# Patient Record
Sex: MALE | Race: BLACK OR AFRICAN AMERICAN | Employment: FULL TIME | URBAN - METROPOLITAN AREA
[De-identification: names, ages, dates, MRNs, and addresses within clinical notes are randomized per-mention and may not be internally consistent; named-entity substitution may affect disease eponyms.]

---

## 2021-05-24 ENCOUNTER — HOSPITAL ENCOUNTER (EMERGENCY)
Age: 41
Discharge: HOME | End: 2021-05-24
Attending: EMERGENCY MEDICINE
Payer: COMMERCIAL

## 2021-05-24 ENCOUNTER — APPOINTMENT (OUTPATIENT)
Dept: CT IMAGING | Age: 41
End: 2021-05-24
Attending: EMERGENCY MEDICINE
Payer: COMMERCIAL

## 2021-05-24 VITALS
RESPIRATION RATE: 16 BRPM | TEMPERATURE: 98 F | OXYGEN SATURATION: 99 % | SYSTOLIC BLOOD PRESSURE: 122 MMHG | BODY MASS INDEX: 23.49 KG/M2 | HEIGHT: 68 IN | WEIGHT: 155 LBS | DIASTOLIC BLOOD PRESSURE: 54 MMHG | HEART RATE: 61 BPM

## 2021-05-24 DIAGNOSIS — N20.0 KIDNEY STONE: Primary | ICD-10-CM

## 2021-05-24 DIAGNOSIS — N28.9 RENAL INSUFFICIENCY: ICD-10-CM

## 2021-05-24 PROCEDURE — 96375 TX/PRO/DX INJ NEW DRUG ADDON: CPT

## 2021-05-24 PROCEDURE — 83690 ASSAY OF LIPASE: CPT | Performed by: EMERGENCY MEDICINE

## 2021-05-24 PROCEDURE — 96361 HYDRATE IV INFUSION ADD-ON: CPT

## 2021-05-24 PROCEDURE — 87086 URINE CULTURE/COLONY COUNT: CPT | Performed by: EMERGENCY MEDICINE

## 2021-05-24 PROCEDURE — 80053 COMPREHEN METABOLIC PANEL: CPT | Performed by: EMERGENCY MEDICINE

## 2021-05-24 PROCEDURE — 99284 EMERGENCY DEPT VISIT MOD MDM: CPT

## 2021-05-24 PROCEDURE — 74176 CT ABD & PELVIS W/O CONTRAST: CPT | Performed by: EMERGENCY MEDICINE

## 2021-05-24 PROCEDURE — 85025 COMPLETE CBC W/AUTO DIFF WBC: CPT | Performed by: EMERGENCY MEDICINE

## 2021-05-24 PROCEDURE — 96374 THER/PROPH/DIAG INJ IV PUSH: CPT

## 2021-05-24 PROCEDURE — 81001 URINALYSIS AUTO W/SCOPE: CPT | Performed by: EMERGENCY MEDICINE

## 2021-05-24 RX ORDER — HYDROCODONE BITARTRATE AND ACETAMINOPHEN 5; 325 MG/1; MG/1
1-2 TABLET ORAL EVERY 6 HOURS PRN
Qty: 10 TABLET | Refills: 0 | Status: ON HOLD | OUTPATIENT
Start: 2021-05-24 | End: 2021-05-27

## 2021-05-24 RX ORDER — KETOROLAC TROMETHAMINE 30 MG/ML
30 INJECTION, SOLUTION INTRAMUSCULAR; INTRAVENOUS ONCE
Status: COMPLETED | OUTPATIENT
Start: 2021-05-24 | End: 2021-05-24

## 2021-05-24 RX ORDER — SODIUM CHLORIDE 9 MG/ML
INJECTION, SOLUTION INTRAVENOUS CONTINUOUS
Status: DISCONTINUED | OUTPATIENT
Start: 2021-05-24 | End: 2021-05-24

## 2021-05-24 RX ORDER — TAMSULOSIN HYDROCHLORIDE 0.4 MG/1
0.4 CAPSULE ORAL DAILY
Qty: 7 CAPSULE | Refills: 0 | Status: SHIPPED | OUTPATIENT
Start: 2021-05-24 | End: 2021-05-31

## 2021-05-24 RX ORDER — ONDANSETRON 4 MG/1
4 TABLET, ORALLY DISINTEGRATING ORAL EVERY 4 HOURS PRN
Qty: 10 TABLET | Refills: 0 | Status: SHIPPED | OUTPATIENT
Start: 2021-05-24 | End: 2021-05-31

## 2021-05-24 RX ORDER — ONDANSETRON 2 MG/ML
4 INJECTION INTRAMUSCULAR; INTRAVENOUS ONCE
Status: COMPLETED | OUTPATIENT
Start: 2021-05-24 | End: 2021-05-24

## 2021-05-24 RX ORDER — PROPRANOLOL HYDROCHLORIDE 60 MG/1
TABLET ORAL
COMMUNITY
Start: 2021-05-21

## 2021-05-24 NOTE — ED PROVIDER NOTES
Patient Seen in: [facility] Emergency Department In [place]      History   Patient presents with:  Abdomen/Flank Pain    Stated Complaint: right flank pain, decreased appetite, emesis since yesterday     HPI/Subjective:   HPI    Patient 72-year-old male w and rhythm, no murmurs. BACK: Mild right CVA tenderness, no midline bony tenderness. ABDOMEN: + Bowel sounds, soft, mild tenderness right flank, nondistended. No rebound, no guarding, no hepatosplenomegaly.   EXTREMITIES: Full range of motion, no tendern diffuse right-sided perinephric fat stranding. 2. There is a 2 mm nonobstructing stone within a right midpole calyx. MDM      Patient with IV fluids. Patient given Toradol for pain. Patient given Zofran. Patient felt improved.   Patient decli

## 2021-05-24 NOTE — ED INITIAL ASSESSMENT (HPI)
Right flank pain since yesterday. Radiates to abd/groin. No fever. Denies urinary symptoms.  +nausea

## 2021-05-26 ENCOUNTER — HOSPITAL ENCOUNTER (OUTPATIENT)
Facility: HOSPITAL | Age: 41
Setting detail: OBSERVATION
Discharge: HOME | End: 2021-05-27
Attending: EMERGENCY MEDICINE | Admitting: HOSPITALIST
Payer: COMMERCIAL

## 2021-05-26 DIAGNOSIS — N20.0 KIDNEY STONE: Primary | ICD-10-CM

## 2021-05-26 PROCEDURE — 99220 INITIAL OBSERVATION CARE,LEVL III: CPT | Performed by: HOSPITALIST

## 2021-05-26 RX ORDER — ONDANSETRON 2 MG/ML
4 INJECTION INTRAMUSCULAR; INTRAVENOUS EVERY 6 HOURS PRN
Status: DISCONTINUED | OUTPATIENT
Start: 2021-05-26 | End: 2021-05-27

## 2021-05-26 RX ORDER — HYDROMORPHONE HYDROCHLORIDE 1 MG/ML
0.5 INJECTION, SOLUTION INTRAMUSCULAR; INTRAVENOUS; SUBCUTANEOUS
Status: DISCONTINUED | OUTPATIENT
Start: 2021-05-26 | End: 2021-05-28

## 2021-05-26 RX ORDER — ONDANSETRON 2 MG/ML
4 INJECTION INTRAMUSCULAR; INTRAVENOUS EVERY 4 HOURS PRN
Status: DISCONTINUED | OUTPATIENT
Start: 2021-05-26 | End: 2021-05-26

## 2021-05-26 RX ORDER — HYDROMORPHONE HYDROCHLORIDE 1 MG/ML
0.5 INJECTION, SOLUTION INTRAMUSCULAR; INTRAVENOUS; SUBCUTANEOUS ONCE
Status: DISCONTINUED | OUTPATIENT
Start: 2021-05-26 | End: 2021-05-26

## 2021-05-26 RX ORDER — SODIUM CHLORIDE 9 MG/ML
INJECTION, SOLUTION INTRAVENOUS CONTINUOUS
Status: DISCONTINUED | OUTPATIENT
Start: 2021-05-26 | End: 2021-05-28

## 2021-05-26 RX ORDER — HYDROMORPHONE HYDROCHLORIDE 1 MG/ML
0.5 INJECTION, SOLUTION INTRAMUSCULAR; INTRAVENOUS; SUBCUTANEOUS EVERY 30 MIN PRN
Status: DISCONTINUED | OUTPATIENT
Start: 2021-05-26 | End: 2021-05-26 | Stop reason: HOSPADM

## 2021-05-26 RX ORDER — TAMSULOSIN HYDROCHLORIDE 0.4 MG/1
0.4 CAPSULE ORAL DAILY
Status: DISCONTINUED | OUTPATIENT
Start: 2021-05-27 | End: 2021-05-28

## 2021-05-26 RX ORDER — ONDANSETRON 2 MG/ML
4 INJECTION INTRAMUSCULAR; INTRAVENOUS EVERY 6 HOURS PRN
Status: DISCONTINUED | OUTPATIENT
Start: 2021-05-26 | End: 2021-05-28

## 2021-05-26 RX ORDER — KETOROLAC TROMETHAMINE 15 MG/ML
15 INJECTION, SOLUTION INTRAMUSCULAR; INTRAVENOUS ONCE
Status: COMPLETED | OUTPATIENT
Start: 2021-05-26 | End: 2021-05-26

## 2021-05-26 RX ORDER — PROCHLORPERAZINE EDISYLATE 5 MG/ML
5 INJECTION INTRAMUSCULAR; INTRAVENOUS EVERY 8 HOURS PRN
Status: DISCONTINUED | OUTPATIENT
Start: 2021-05-26 | End: 2021-05-28

## 2021-05-26 RX ORDER — SODIUM CHLORIDE 9 MG/ML
INJECTION, SOLUTION INTRAVENOUS CONTINUOUS
Status: ACTIVE | OUTPATIENT
Start: 2021-05-26 | End: 2021-05-27

## 2021-05-26 RX ORDER — SODIUM CHLORIDE 9 MG/ML
INJECTION, SOLUTION INTRAVENOUS CONTINUOUS
Status: DISCONTINUED | OUTPATIENT
Start: 2021-05-27 | End: 2021-05-28

## 2021-05-26 RX ORDER — KETOROLAC TROMETHAMINE 30 MG/ML
30 INJECTION, SOLUTION INTRAMUSCULAR; INTRAVENOUS EVERY 6 HOURS PRN
Status: DISCONTINUED | OUTPATIENT
Start: 2021-05-26 | End: 2021-05-28

## 2021-05-26 NOTE — ED INITIAL ASSESSMENT (HPI)
Seen here Monday for r flank pain- dx with kidney stone-- taking pain meds at home but pain returned

## 2021-05-26 NOTE — ED PROVIDER NOTES
Patient Seen in: THE [facility] Emergency Department In [place]      History   Patient presents with:  Abdomen/Flank Pain    Stated Complaint: flank pain, kidney stone    HPI/Subjective:   HPI    55-year-old -American male presents emergency room today to auscultation bilaterally. Heart is regular rate and rhythm without murmur gallop or rub    Abdomen is soft nondistended nontender to deep palpation there is no rebound or guarding noted no hepatosplenomegaly is noted. No masses are noted.   No hernias his kidney stone.   Admission disposition: 5/26/2021  8:06 PM                                  Disposition and Plan     Clinical Impression:  Kidney stone  (primary encounter diagnosis)     Disposition:  Admit  5/26/2021  8:06 pm    Follow-up:  No follow-up

## 2021-05-27 ENCOUNTER — APPOINTMENT (OUTPATIENT)
Dept: GENERAL RADIOLOGY | Facility: HOSPITAL | Age: 41
End: 2021-05-27
Attending: INTERNAL MEDICINE
Payer: COMMERCIAL

## 2021-05-27 ENCOUNTER — ANESTHESIA (OUTPATIENT)
Dept: SURGERY | Facility: HOSPITAL | Age: 41
End: 2021-05-27
Payer: COMMERCIAL

## 2021-05-27 ENCOUNTER — ANESTHESIA EVENT (OUTPATIENT)
Dept: SURGERY | Facility: HOSPITAL | Age: 41
End: 2021-05-27
Payer: COMMERCIAL

## 2021-05-27 VITALS
BODY MASS INDEX: 24.25 KG/M2 | RESPIRATION RATE: 20 BRPM | TEMPERATURE: 98 F | HEART RATE: 68 BPM | WEIGHT: 160 LBS | SYSTOLIC BLOOD PRESSURE: 150 MMHG | DIASTOLIC BLOOD PRESSURE: 75 MMHG | HEIGHT: 68 IN | OXYGEN SATURATION: 96 %

## 2021-05-27 PROCEDURE — 0TC68ZZ EXTIRPATION OF MATTER FROM RIGHT URETER, VIA NATURAL OR ARTIFICIAL OPENING ENDOSCOPIC: ICD-10-PCS | Performed by: UROLOGY

## 2021-05-27 PROCEDURE — 0T768DZ DILATION OF RIGHT URETER WITH INTRALUMINAL DEVICE, VIA NATURAL OR ARTIFICIAL OPENING ENDOSCOPIC: ICD-10-PCS | Performed by: UROLOGY

## 2021-05-27 PROCEDURE — 99225 SUBSEQUENT OBSERVATION CARE: CPT | Performed by: INTERNAL MEDICINE

## 2021-05-27 DEVICE — URETERAL STENT
Type: IMPLANTABLE DEVICE | Site: URETER | Status: FUNCTIONAL
Brand: ASCERTA™

## 2021-05-27 RX ORDER — HYDROMORPHONE HYDROCHLORIDE 1 MG/ML
0.4 INJECTION, SOLUTION INTRAMUSCULAR; INTRAVENOUS; SUBCUTANEOUS EVERY 5 MIN PRN
Status: DISCONTINUED | OUTPATIENT
Start: 2021-05-27 | End: 2021-05-27 | Stop reason: HOSPADM

## 2021-05-27 RX ORDER — NALOXONE HYDROCHLORIDE 0.4 MG/ML
80 INJECTION, SOLUTION INTRAMUSCULAR; INTRAVENOUS; SUBCUTANEOUS AS NEEDED
Status: DISCONTINUED | OUTPATIENT
Start: 2021-05-27 | End: 2021-05-27 | Stop reason: HOSPADM

## 2021-05-27 RX ORDER — METOCLOPRAMIDE HYDROCHLORIDE 5 MG/ML
10 INJECTION INTRAMUSCULAR; INTRAVENOUS AS NEEDED
Status: DISCONTINUED | OUTPATIENT
Start: 2021-05-27 | End: 2021-05-27 | Stop reason: HOSPADM

## 2021-05-27 RX ORDER — DEXAMETHASONE SODIUM PHOSPHATE 4 MG/ML
VIAL (ML) INJECTION AS NEEDED
Status: DISCONTINUED | OUTPATIENT
Start: 2021-05-27 | End: 2021-05-27 | Stop reason: SURG

## 2021-05-27 RX ORDER — HYDROCODONE BITARTRATE AND ACETAMINOPHEN 5; 325 MG/1; MG/1
2 TABLET ORAL AS NEEDED
Status: DISCONTINUED | OUTPATIENT
Start: 2021-05-27 | End: 2021-05-27 | Stop reason: HOSPADM

## 2021-05-27 RX ORDER — ONDANSETRON 2 MG/ML
4 INJECTION INTRAMUSCULAR; INTRAVENOUS AS NEEDED
Status: DISCONTINUED | OUTPATIENT
Start: 2021-05-27 | End: 2021-05-27 | Stop reason: HOSPADM

## 2021-05-27 RX ORDER — SODIUM CHLORIDE, SODIUM LACTATE, POTASSIUM CHLORIDE, CALCIUM CHLORIDE 600; 310; 30; 20 MG/100ML; MG/100ML; MG/100ML; MG/100ML
INJECTION, SOLUTION INTRAVENOUS CONTINUOUS
Status: DISCONTINUED | OUTPATIENT
Start: 2021-05-27 | End: 2021-05-27 | Stop reason: HOSPADM

## 2021-05-27 RX ORDER — HYDROCODONE BITARTRATE AND ACETAMINOPHEN 5; 325 MG/1; MG/1
1 TABLET ORAL AS NEEDED
Status: DISCONTINUED | OUTPATIENT
Start: 2021-05-27 | End: 2021-05-27 | Stop reason: HOSPADM

## 2021-05-27 RX ORDER — ONDANSETRON 2 MG/ML
INJECTION INTRAMUSCULAR; INTRAVENOUS AS NEEDED
Status: DISCONTINUED | OUTPATIENT
Start: 2021-05-27 | End: 2021-05-27 | Stop reason: SURG

## 2021-05-27 RX ORDER — LIDOCAINE HYDROCHLORIDE 20 MG/ML
JELLY TOPICAL AS NEEDED
Status: DISCONTINUED | OUTPATIENT
Start: 2021-05-27 | End: 2021-05-27 | Stop reason: HOSPADM

## 2021-05-27 RX ORDER — CEFAZOLIN SODIUM/WATER 2 G/20 ML
2 SYRINGE (ML) INTRAVENOUS
Status: COMPLETED | OUTPATIENT
Start: 2021-05-27 | End: 2021-05-27

## 2021-05-27 RX ORDER — MIDAZOLAM HYDROCHLORIDE 1 MG/ML
INJECTION INTRAMUSCULAR; INTRAVENOUS AS NEEDED
Status: DISCONTINUED | OUTPATIENT
Start: 2021-05-27 | End: 2021-05-27 | Stop reason: SURG

## 2021-05-27 RX ORDER — LIDOCAINE HYDROCHLORIDE 10 MG/ML
INJECTION, SOLUTION EPIDURAL; INFILTRATION; INTRACAUDAL; PERINEURAL AS NEEDED
Status: DISCONTINUED | OUTPATIENT
Start: 2021-05-27 | End: 2021-05-27 | Stop reason: SURG

## 2021-05-27 RX ORDER — HYDROCODONE BITARTRATE AND ACETAMINOPHEN 5; 325 MG/1; MG/1
1-2 TABLET ORAL EVERY 6 HOURS PRN
Qty: 10 TABLET | Refills: 0 | Status: SHIPPED | OUTPATIENT
Start: 2021-05-27 | End: 2021-06-03

## 2021-05-27 RX ADMIN — ONDANSETRON 4 MG: 2 INJECTION INTRAMUSCULAR; INTRAVENOUS at 16:40:00

## 2021-05-27 RX ADMIN — LIDOCAINE HYDROCHLORIDE 50 MG: 10 INJECTION, SOLUTION EPIDURAL; INFILTRATION; INTRACAUDAL; PERINEURAL at 16:03:00

## 2021-05-27 RX ADMIN — MIDAZOLAM HYDROCHLORIDE 2 MG: 1 INJECTION INTRAMUSCULAR; INTRAVENOUS at 16:03:00

## 2021-05-27 RX ADMIN — DEXAMETHASONE SODIUM PHOSPHATE 4 MG: 4 MG/ML VIAL (ML) INJECTION at 16:17:00

## 2021-05-27 RX ADMIN — CEFAZOLIN SODIUM/WATER 2 G: 2 G/20 ML SYRINGE (ML) INTRAVENOUS at 16:04:00

## 2021-05-27 NOTE — PROGRESS NOTES
Pt returned from PACU in stable condition. Regular diet. Pt denies any pain. VS stable, afebrile. Pt reported he voided in PACU; pt will be due to void. Pt instructed to use call light when wanted to get out of bed. Wife at bedside. Plan of care discussed.

## 2021-05-27 NOTE — H&P
[facility]  History and Physical     Amado Wilson Patient Status:  Observation    1980 MRN [id number]   [facility] ([place], [place]) 3NW-A Attending Veronica Oreilly MD   Hosp Day # 0 PCP No primary care provider on file.      Chief Compla capsule (0.4 mg total) by mouth daily for 7 days. , Disp: 7 capsule, Rfl: 0        Review of Systems:   A comprehensive 14 point review of systems was completed. Pertinent positives and negatives noted in the HPI.     Physical Exam:    /74 (BP Locat CK in the last 168 hours. Inflammatory Markers  No results for input(s): CRP, COLEEN, LDH, DDIMER in the last 168 hours. Imaging: Imaging data reviewed in Epic. ASSESSMENT / PLAN:     1. Kidney stone  1. IVF  2. NPO  3. Urology on consult  4.  Pain

## 2021-05-27 NOTE — PROGRESS NOTES
NURSING ADMISSION NOTE      Patient admitted via Wheelchair  Oriented to room. Safety precautions initiated. Bed in low position. Call light in reach. Pt stable, resting in bed. MD hospitalist paged for orders.  Dr Karmen Garnica aware of patient admission

## 2021-05-27 NOTE — PROGRESS NOTES
[facility]  Progress Note     Maggie Joe Patient Status:  Observation    1980 MRN [id number]   [facility] 3NW-A Attending Otoniel Melendez,    Hosp Day # 0 PCP No primary care provider on file.      Chief Complaint in the last 168 hours. Cardiac  No results for input(s): TROP, PBNP in the last 168 hours. Creatinine Kinase  No results for input(s): CK in the last 168 hours.     Inflammatory Markers  No results for input(s): CRP, COLEEN, LDH, DDIMER in the last 168 h

## 2021-05-27 NOTE — ANESTHESIA PROCEDURE NOTES
Airway  Date/Time: 5/27/2021 4:01 PM  Urgency: elective    Airway not difficult    General Information and Staff    Patient location during procedure: OR  Anesthesiologist: Renee Burrell MD  Performed: anesthesiologist     Indications and Patient Conditio

## 2021-05-27 NOTE — ANESTHESIA PREPROCEDURE EVALUATION
PRE-OP EVALUATION    Patient Name: Ragini Perez    Admit Diagnosis: Kidney stone [N20.0]    Pre-op Diagnosis: Right ureteral calculus [N20.1]    CYSTOSCOPY, RIGHT RETROGRADE PYELOGRAM, RIGHT URETEROSCOPIC STONE EXTRACTION, POSSIBLE LASER LITHTRIPSY, R allergies. Anesthesia Evaluation    Patient summary reviewed.     Anesthetic Complications           GI/Hepatic/Renal  Comment: Kidney stone  MANAN                               Cardiovascular            MET: >4      (+) hypertension

## 2021-05-27 NOTE — ANESTHESIA POSTPROCEDURE EVALUATION
[address] Patient Status:  Observation   Age/Gender 36year old male MRN [id number]   [facility] SURGERY Attending Holly Mccabe, [facility] Day # 0 PCP No primary care provider on file.        Anesthesia Post-o

## 2021-05-27 NOTE — CONSULTS
[facility] Urology   Consultation Note    Robin Santiago Patient Status:  Observation    1980 MRN [id number]   [facility] 3NW-A Attending Ran Scott MD   Hosp Day # 0 PCP No primary care provider on file.      Reason review of systems completed.       Physical Exam:  BP (!) 112/91 (BP Location: Right arm)   Pulse 51   Temp 98 °F (36.7 °C) (Oral)   Resp 16   Ht 5' 8\" (1.727 m)   Wt 160 lb (72.6 kg)   SpO2 99%   BMI 24.33 kg/m²   GENERAL: The patient is resting in bed in CONCLUSION:     1. There is mild right-sided hydronephrosis and hydroureter secondary to a 4 mm obstructing stone within the mid right ureter.  There is mild diffuse right-sided perinephric fat stranding.    2. There is a 2 mm nonobstructing stone within and infection. Also, the scope and laser are capable of perforating the ureter or kidney which could result in a scar or stricture requiring future procedures. These considerations were thoroughly discussed with the patient.       Discussed treatment option 15.9 12.9* 12.1*   HCT 46.9 37.1* 35.2*   MCV 89.2 87.9 89.3   MCH 30.2 30.6 30.7   MCHC 33.9 34.8 34.4   RDW 12.4 12.0 11.9   NEPRELIM 16.86* 11.70* 8.30*   WBC 21.1* 15.8* 12.7*   .0 212.0 197.0       Recent Labs   Lab 05/24/21  1341 05/27/21  053

## 2021-05-27 NOTE — PLAN OF CARE
Pt alert and oriented x4. Pt denies need for pain medication. Pt sleepy, easy to arouse. NPO since midnight; pre op flowsheet completed with patient. Up ad martha. Pt denies Sob, chest pain, n/v. Pt denies difficulty voiding. IVF infusing per mar.  Wife at bed period  Description: INTERVENTIONS  - Monitor WBC  - Administer growth factors as ordered  - Implement neutropenic guidelines  5/27/2021 1025 by Tiffany Aguero RN  Outcome: Progressing  5/27/2021 1025 by Tiffany Aguero RN  Outcome: Progressing     Prob RN  Outcome: Progressing

## 2021-05-27 NOTE — PLAN OF CARE
A&Ox4. VSS. GI: Abdomen soft, nondistended. Passing gas. Denies nausea at this time. : Voids. Complaints of right sided flank pain. Ice packs offered. Pain controlled with PRN pain medications. Toradol and Dilaudid ordered. Up ad martha.   Diet: General FALL  Goal: Free from fall injury  Description: INTERVENTIONS:  - Assess pt frequently for physical needs  - Identify cognitive and physical deficits and behaviors that affect risk of falls.   - Esmond fall precautions as indicated by assessment.  - Educ

## 2021-05-27 NOTE — OPERATIVE REPORT
URETEROSCOPY OPERATIVE NOTE    PATIENT NAME: Rosalie Reinoso  YOB: 1980  DATE OF SERVICE: 5/27/2021    SURGEON:  You Gutierrez MD    ASSISTANT:  None    PREOPERATIVE DIAGNOSIS:  Right Mid ureteral calculus    POSTOPERATIVE DIAGNOSIS:  Same and prepped and draped in the normal sterile fashion using betadine. We then performed an operative time out to confirm the correct patient, procedure, and laterality. Everyone in the room was in agreement.      I began by inserting a 22F rigid cystoscope

## 2021-05-28 NOTE — PROGRESS NOTES
NURSING DISCHARGE NOTE    Dr Kylah Cabrera paged for pain medication prescription at discharge. Discharge instructions discussed with pt and his wife. All questions answered. Pt declining transport and discharged Home via Ambulatory.  Accompanied by Family me

## 2021-05-28 NOTE — PAYOR COMM NOTE
Discharge Notification    Patient Name: Jesse Maloney  Payor: Bryn [place]  Subscriber #: [id number]  Authorization Number: N/A  Admit Date/Time: 5/26/2021 5:55 PM  Discharge Date/Time: 5/27/2021 8:20 PM

## (undated) DEVICE — Device

## (undated) DEVICE — SYRINGE 20CC LL TIP

## (undated) DEVICE — ZIPWIRE GUIDEWIRE .035X150 STR

## (undated) DEVICE — CYSTO CDS-LF: Brand: MEDLINE INDUSTRIES, INC.

## (undated) DEVICE — 1.9FR NITINOL TIPLESS BSKT

## (undated) DEVICE — ADHESIVE MASTISOL 2/3CC VL

## (undated) DEVICE — SCD SLEEVE KNEE HI BLEND

## (undated) DEVICE — SOL  .9 3000ML

## (undated) DEVICE — STERILE SYNTHETIC NEOPRENE POWDER-FREE SURGICAL GLOVES WITH NITRILE COATING, SMOOTH FINISH, STRAIGHT FINGER: Brand: PROTEXIS

## (undated) DEVICE — TIGERTAIL 5F FLXTIP 70CM

## (undated) NOTE — LETTER
Terri Shah 89  [address] [facility] [address] [facility]  Authorization for Surgical Operation and Procedure     Date:___________                                                                                                         Time:__________ transfusion and/or blood products.   The following are some, but not all, of the potential risks that can occur: fever and allergic reactions, hemolytic reactions, transmission of diseases such as Hepatitis, AIDS and Cytomegalovirus (CMV) and fluid overload person authorized to consent on my behalf). The surgeon or my attending physician will determine when the applicable recovery period ends for purposes of reinstating the DNAR order.   10. Patients having a sterilization procedure: I understand that if the p patient asking for anesthesia services, I agree to:  a. Allow the anesthesiologist (anesthesia doctor) to give me medicine and do additional procedures as necessary.  Some examples are: Starting or using an “IV” to give me medicine, fluids or blood during m rhythms and nerve injury. 7. Regional Anesthesia (“spinal”, “epidural”, & “nerve blocks”): I understand that rare but potential complications include headache, bleeding, infection, seizure, irregular heart rhythms, and nerve injury.     I can change my mi

## (undated) NOTE — LETTER
Terri Shah 89  [address] [facility] [address] [facility]  Authorization for Surgical Operation and Procedure     Date:___________                                                                                                         Time:__________ as a result of receiving a blood transfusion and/or blood products.   The following are some, but not all, of the potential risks that can occur: fever and allergic reactions, hemolytic reactions, transmission of diseases such as Hepatitis, AIDS and Cytomeg explicitly stated by me (or a person authorized to consent on my behalf). The surgeon or my attending physician will determine when the applicable recovery period ends for purposes of reinstating the DNAR order.   10. Patients having a sterilization procedu [employer]    2. As the patient asking for anesthesia services, I agree to:  a. Allow the anesthesiologist (anesthesia doctor) to give me medicine and do additional procedures as necessary.  Some examples are: Starting or using an “IV” to give bleeding, seizure, irregular heart rhythms and nerve injury. 7. Regional Anesthesia (“spinal”, “epidural”, & “nerve blocks”):   I understand that rare but potential complications include headache, bleeding, infection, seizure, irregular heart rhythms, and

## (undated) NOTE — LETTER
Terri Shah 89  [address] [facility] [address] [facility]  Authorization for Surgical Operation and Procedure     Date:___________                                                                                                         Time:__________ transfusion and/or blood products.   The following are some, but not all, of the potential risks that can occur: fever and allergic reactions, hemolytic reactions, transmission of diseases such as Hepatitis, AIDS and Cytomegalovirus (CMV) and fluid overload person authorized to consent on my behalf). The surgeon or my attending physician will determine when the applicable recovery period ends for purposes of reinstating the DNAR order.   10. Patients having a sterilization procedure: I understand that if the p patient asking for anesthesia services, I agree to:  a. Allow the anesthesiologist (anesthesia doctor) to give me medicine and do additional procedures as necessary.  Some examples are: Starting or using an “IV” to give me medicine, fluids or blood during m rhythms and nerve injury. 7. Regional Anesthesia (“spinal”, “epidural”, & “nerve blocks”): I understand that rare but potential complications include headache, bleeding, infection, seizure, irregular heart rhythms, and nerve injury.     I can change my mi

## (undated) NOTE — LETTER
Terri Shah 89  [address] [facility] [address] [facility]  Authorization for Surgical Operation and Procedure     Date:___________                                                                                                         Time:__________ and/or blood products. The following are some, but not all, of the potential risks that can occur: fever and allergic reactions, hemolytic reactions, transmission of diseases such as Hepatitis, AIDS and Cytomegalovirus (CMV) and fluid overload.   In the ev authorized to consent on my behalf). The surgeon or my attending physician will determine when the applicable recovery period ends for purposes of reinstating the DNAR order.   10. Patients having a sterilization procedure: I understand that if the procedur asking for anesthesia services, I agree to:  a. Allow the anesthesiologist (anesthesia doctor) to give me medicine and do additional procedures as necessary.  Some examples are: Starting or using an “IV” to give me medicine, fluids or blood during my proced nerve injury. 7. Regional Anesthesia (“spinal”, “epidural”, & “nerve blocks”): I understand that rare but potential complications include headache, bleeding, infection, seizure, irregular heart rhythms, and nerve injury.     I can change my mind about hav